# Patient Record
Sex: FEMALE | Race: OTHER | HISPANIC OR LATINO | URBAN - METROPOLITAN AREA
[De-identification: names, ages, dates, MRNs, and addresses within clinical notes are randomized per-mention and may not be internally consistent; named-entity substitution may affect disease eponyms.]

---

## 2019-08-05 ENCOUNTER — EMERGENCY (EMERGENCY)
Facility: HOSPITAL | Age: 29
LOS: 1 days | Discharge: HOME | End: 2019-08-05
Attending: EMERGENCY MEDICINE
Payer: COMMERCIAL

## 2019-08-05 VITALS
OXYGEN SATURATION: 100 % | HEART RATE: 81 BPM | TEMPERATURE: 98 F | WEIGHT: 167.99 LBS | HEIGHT: 65 IN | RESPIRATION RATE: 18 BRPM | SYSTOLIC BLOOD PRESSURE: 143 MMHG | DIASTOLIC BLOOD PRESSURE: 85 MMHG

## 2019-08-05 PROCEDURE — 93010 ELECTROCARDIOGRAM REPORT: CPT

## 2019-08-05 PROCEDURE — 99284 EMERGENCY DEPT VISIT MOD MDM: CPT

## 2019-08-05 NOTE — ED ADULT TRIAGE NOTE - CHIEF COMPLAINT QUOTE
sob x 3 days - denies pmh; +cough as per pt sob x 3 days - denies pmh; +cough as per pt  @[id number] pt now c/o chest heaviness - ekg completed

## 2019-08-06 VITALS
OXYGEN SATURATION: 100 % | SYSTOLIC BLOOD PRESSURE: 106 MMHG | HEART RATE: 74 BPM | TEMPERATURE: 98 F | RESPIRATION RATE: 17 BRPM | DIASTOLIC BLOOD PRESSURE: 71 MMHG

## 2019-08-06 PROCEDURE — 76705 ECHO EXAM OF ABDOMEN: CPT | Mod: 26

## 2019-08-06 PROCEDURE — 76705 ECHO EXAM OF ABDOMEN: CPT

## 2019-08-06 PROCEDURE — 71046 X-RAY EXAM CHEST 2 VIEWS: CPT | Mod: 26

## 2019-08-06 PROCEDURE — 71046 X-RAY EXAM CHEST 2 VIEWS: CPT

## 2019-08-06 PROCEDURE — 93005 ELECTROCARDIOGRAM TRACING: CPT

## 2019-08-06 PROCEDURE — 99284 EMERGENCY DEPT VISIT MOD MDM: CPT | Mod: 25

## 2019-08-06 NOTE — ED PROVIDER NOTE - PHYSICAL EXAMINATION
Gen: AAOx3, non-toxic appearing  Head: NCAT  HEENT: EOMI, oral mucosa moist, normal conjunctiva  Lung: CTAB, no respiratory distress, no wheezes/rhonchi/rales B/L, speaking in full sentences  CV: RRR, no murmurs, rubs or gallops  Abd: soft, NTND, no guarding, no CVA tenderness  MSK: no visible deformities  Neuro: no focal sensory or motor deficits, normal CN exam   Skin: warm, well perfused, no rash  Psych: normal affect    -Esteban Castañeda, PGY-1 Gen: AAOx3, non-toxic appearing  Head: NCAT  HEENT: EOMI, oral mucosa moist, normal conjunctiva  Lung: CTAB, no respiratory distress, no wheezes/rhonchi/rales B/L, speaking in full sentences  CV: RRR, no murmurs, rubs or gallops  Abd: soft, NTND, no guarding, no CVA tenderness  MSK: no visible deformities  Neuro: no focal sensory or motor deficits, normal CN exam   Skin: warm, well perfused, no rash  Psych: normal affect    -Esteban Castañeda, PGY-1  Attending Carolyn Goddard: Gen: NAD, heent: atrauamtic, eomi, perrla, mmm, op pink, uvula midline, neck; nttp, no nuchal rigidity, chest: nttp, no crepitus, cv: rrr, no murmurs, lungs: ctab, abd: soft, nontender, nondistended, no peritoneal signs, +BS, no guarding, ext: wwp, neg homans, skin: no rash, neuro: awake and alert, following commands, speech clear, sensation and strength intact, no focal deficits

## 2019-08-06 NOTE — ED PROVIDER NOTE - NS ED ATTENDING STATEMENT MOD
I have personally seen and examined this patient.  I have fully participated in the care of this patient. I have reviewed all pertinent clinical information, including history, physical exam, plan and the Resident’s note and agree except as noted.
Speaking Coherently

## 2019-08-06 NOTE — ED PROVIDER NOTE - NSFOLLOWUPINSTRUCTIONS_ED_ALL_ED_FT
Please return immediately if you have worsening abdominal pain, any fevers, any pain when you breath, any swelling in your legs or further concerns  Please follow up with the stomach doctors  Please avoid any motrin or ibuprofen  Please avoid drinking any alcohol.  Please continue all current medications

## 2019-08-06 NOTE — ED PROVIDER NOTE - OBJECTIVE STATEMENT
Dr. Esteban Castañeda, PGY1: Patient is a 30 y/o female no significant PMH presents to the ED with epigastric pain. Patient states the pain started Saturday with gradual onset. Pain is intermittent and can be 9/10 at its worst. Pain is in epigastrum and radiates into chest. Patient occasionally feels short of breath with the pain episodes. Pain worse after meals and with laying flat. Relieved with sitting upright. Patient unsure of endoscopy diagnosis and cannot recall medications started afterwards. Admits to cough and occasional diarrhea since Saturday. Denies recent travel or sick contacts. Denies fever, chills, visual changes, shortness of breath, or N/V/C. Patient currently denies pain. Dr. Esteban Castañeda, PGY1: Patient is a 28 y/o female no significant PMH presents to the ED with epigastric pain. Patient states the pain started Saturday with gradual onset. Pain is intermittent and can be 9/10 at its worst. Pain is in epigastrum and radiates into chest. Patient occasionally feels short of breath with the pain episodes. Pain worse after meals and with laying flat. Relieved with sitting upright. Patient unsure of endoscopy diagnosis and cannot recall medications started afterwards. Admits to cough and occasional diarrhea since Saturday. Denies recent travel or sick contacts. Denies fever, chills, visual changes, shortness of breath, or N/V/C. Patient currently denies pain.  Attending Carolyn Goddard: 28 y/o previously heatlhy female presenting with epigastric pain. pt states has been having epigastric pain. had endoscopy performed and told has gastritiis and started on medication. also told may have a bacteria in stomach. has follow up with GI this week. no fevers or chills. no pain with inspiration. today when laying down felt burning in upper chest with assocaited sob. currently symptoms resolved. no pain with inspiration now or sob. no recent travel. not on birth control pills. no fh of cardiac disease

## 2019-08-06 NOTE — ED PROVIDER NOTE - ATTENDING CONTRIBUTION TO CARE
Attending MD Carolyn Goddard:  I personally have seen and examined this patient.  Resident note reviewed and agree on plan of care and except where noted.  See HPI, PE, and MDM for details.

## 2019-08-06 NOTE — ED PROVIDER NOTE - PROGRESS NOTE DETAILS
Attending Carolyn Goddard: pt well appearing. abd soft on exam. asymptomatic. has GI follow up. given precautions to return for any worsening pain

## 2019-08-06 NOTE — ED ADULT NURSE NOTE - NSIMPLEMENTINTERV_GEN_ALL_ED
Implemented All Universal Safety Interventions:  [place] to call system. Call bell, personal items and telephone within reach. Instruct patient to call for assistance. Room bathroom lighting operational. Non-slip footwear when patient is off stretcher. Physically safe environment: no spills, clutter or unnecessary equipment. Stretcher in lowest position, wheels locked, appropriate side rails in place.

## 2019-08-06 NOTE — ED PROVIDER NOTE - CLINICAL SUMMARY MEDICAL DECISION MAKING FREE TEXT BOX
Attending Carolyn Goddard: 28 y/o female presenting with epigastric burning with associated sob. had recent endoscopy and told likely PUD vs gastritis. on medication unclear which ones. upon arrival pt asymptomatic and well appearing. abd soft on exam. pocus shows no evidence of biliary disease. ekg without st changes and history atypical for a cardiac etiology. likley gastritis vs pud. unclear if on abx for h pylori. as pain free unlikely acute surgical process at this time. long discussio nwgertrude pt about importance of close return precautions for any worsening pain . will d/

## 2019-08-06 NOTE — ED PROVIDER NOTE - CHIEF COMPLAINT
The patient is a 29y Female complaining of shortness of breath. The patient is a 29y Female complaining of epigastric pain.